# Patient Record
Sex: FEMALE | Race: WHITE
[De-identification: names, ages, dates, MRNs, and addresses within clinical notes are randomized per-mention and may not be internally consistent; named-entity substitution may affect disease eponyms.]

---

## 2019-09-15 ENCOUNTER — HOSPITAL ENCOUNTER (EMERGENCY)
Dept: HOSPITAL 56 - MW.ED | Age: 29
Discharge: HOME | End: 2019-09-15
Payer: COMMERCIAL

## 2019-09-15 DIAGNOSIS — N93.9: Primary | ICD-10-CM

## 2019-09-15 LAB
BUN SERPL-MCNC: 8 MG/DL (ref 7–18)
CHLORIDE SERPL-SCNC: 105 MMOL/L (ref 98–107)
CO2 SERPL-SCNC: 25.8 MMOL/L (ref 21–32)
GLUCOSE SERPL-MCNC: 96 MG/DL (ref 74–106)
POTASSIUM SERPL-SCNC: 3.9 MMOL/L (ref 3.5–5.1)
SODIUM SERPL-SCNC: 141 MMOL/L (ref 136–145)

## 2019-09-15 PROCEDURE — 86901 BLOOD TYPING SEROLOGIC RH(D): CPT

## 2019-09-15 PROCEDURE — 96361 HYDRATE IV INFUSION ADD-ON: CPT

## 2019-09-15 PROCEDURE — 86850 RBC ANTIBODY SCREEN: CPT

## 2019-09-15 PROCEDURE — 85610 PROTHROMBIN TIME: CPT

## 2019-09-15 PROCEDURE — 84702 CHORIONIC GONADOTROPIN TEST: CPT

## 2019-09-15 PROCEDURE — 80053 COMPREHEN METABOLIC PANEL: CPT

## 2019-09-15 PROCEDURE — 96372 THER/PROPH/DIAG INJ SC/IM: CPT

## 2019-09-15 PROCEDURE — 99284 EMERGENCY DEPT VISIT MOD MDM: CPT

## 2019-09-15 PROCEDURE — 96375 TX/PRO/DX INJ NEW DRUG ADDON: CPT

## 2019-09-15 PROCEDURE — 96374 THER/PROPH/DIAG INJ IV PUSH: CPT

## 2019-09-15 PROCEDURE — 76817 TRANSVAGINAL US OBSTETRIC: CPT

## 2019-09-15 PROCEDURE — 85025 COMPLETE CBC W/AUTO DIFF WBC: CPT

## 2019-09-15 PROCEDURE — 86900 BLOOD TYPING SEROLOGIC ABO: CPT

## 2019-09-15 NOTE — EDM.PDOC
ED HPI GENERAL MEDICAL PROBLEM





- General


Chief Complaint: OB/GYN Problem


Stated Complaint: PAIN IN OVARIES


Time Seen by Provider: 09/15/19 11:19


Source of Information: Reports: Patient


History Limitations: Reports: No Limitations





- History of Present Illness


INITIAL COMMENTS - FREE TEXT/NARRATIVE: 





HISTORY AND PHYSICAL:





History of present illness:


Patient is a 29-year-old female presents to the ED with complaint of pelvic 

pain. Patient states she was 6 weeks gestation and had an elective medical 

 4 days ago in [place], [place]. She had taken cytotec and states she had 

pain for 2 days but resolved yesterday. Today she was sleeping and woke up with 

severe lower abdominal pain. She has been soaking a super pad every hour since 

the bleeding started, she states the bleeding cleared yesterday but returned 

today. She states she is passing small clots, no larger than a quarter. 





Review of systems: 


As per history of present illness and below otherwise all systems reviewed and 

negative.





Past medical history: 


As per history of present illness and as reviewed below otherwise 

noncontributory.





Surgical history: 


As per history of present illness and as reviewed below otherwise 

noncontributory.





Social history: 


No reported history of drug or alcohol abuse.





Family history: 


As per history of present illness and as reviewed below otherwise 

noncontributory.





Physical exam:


General: Patient sitting comfortably in no acute distress and nontoxic appearing


HEENT: Atraumatic, normocephalic, pupils reactive, negative for conjunctival 

pallor or scleral icterus, mucous membranes moist, throat clear, neck supple, 

nontender, trachea midline. No meningeal signs. 


Lungs: Clear to auscultation, breath sounds equal bilaterally, chest nontender.


Heart: S1S2, regular, negative for clicks, rubs, or overt murmur.


Abdomen: Soft, nondistended, nontender. Negative for masses or 

hepatosplenomegaly. Negative for costovertebral tenderness. No rigidity, rebound

, guarding.


Pelvis: Stable nontender.


Genitourinary: There is a moderate amount of blood in the vaginal vault with an 

egg sized clot removed from the cervix 


Rectal: Deferred.


Extremities: Atraumatic, negative for cords or calf pain. Neurovascular 

unremarkable.


Neuro: Awake, alert, oriented. Cranial nerves II through XII unremarkable. 

Cerebellum unremarkable. Motor and sensory unremarkable throughout. Exam 

nonfocal.





Notes: Discussed findings with Dr. West. She advised giving the patient IM 

methergine and a prescription for this for 2 days as well as a 1g dose of 

azithromycin. She will follow up with her on Thursday of this week. 





Diagnostics:


CBC, CMP, Type and screen, hcg quant, OB US 





Therapeutics:


1L NS IV 


4mg Zofran IV 


1mg dilaudid IV 





Prescriptions:


Methergine 


Norco (#12) 





Impression: 


pelvic pain and bleeding status post elective  





Plan:


Take medication as instructed. You may take norco as needed for severe pain, do 

not take while driving as it may make you drowsy 


Follow up with Dr. Pritchett on , please call the number provided to 

schedule your appointment


Return to the ED as needed as discussed 





Definitive disposition and diagnosis as appropriate pending reevaluation and 

review of above.





  ** Pelvic


Pain Score (Numeric/FACES): 10





- Related Data


 Allergies











Allergy/AdvReac Type Severity Reaction Status Date / Time


 


No Known Allergies Allergy   Verified 09/15/19 11:15











Home Meds: 


 Home Meds





Dextroamphetamine/Amphetamine [Adderall 10 mg Tablet] 10 mg PO DAILY 09/15/19 [

History]


Ibuprofen 800 mg PO DAILY 09/15/19 [History]


Methylergonovine [Methergine] 0.2 mg PO Q8HR 2 Days #6 tablet 09/15/19 [Rx]











ED ROS GENERAL





- Review of Systems


Review Of Systems: ROS reveals no pertinent complaints other than HPI.





ED EXAM, GI/ABD





- Physical Exam


Exam: See Below (see dictation)





Course





- Vital Signs


Last Recorded V/S: 


 Last Vital Signs











Temp  98.9 F   09/15/19 11:18


 


Pulse  75   09/15/19 12:01


 


Resp  22 H  09/15/19 12:01


 


BP  111/53 L  09/15/19 12:01


 


Pulse Ox  100   09/15/19 12:01














- Orders/Labs/Meds


Orders: 


 Active Orders 24 hr











 Category Date Time Status


 


 Sodium Chloride 0.9% [Saline Flush] Med  09/15/19 11:13 Active





 10 ml FLUSH ASDIRECTED PRN   


 


 Sodium Chloride 0.9% [Saline Flush] Med  09/15/19 11:13 Active





 2.5 ml FLUSH ASDIRECTED PRN   


 


 Saline Lock Insert [OM.PC] Stat Oth  09/15/19 11:13 Ordered








 Medication Orders





Sodium Chloride (Saline Flush)  10 ml FLUSH ASDIRECTED PRN


   PRN Reason: Keep Vein Open


   Last Admin: 09/15/19 11:37  Dose: 10 ml


Sodium Chloride (Saline Flush)  2.5 ml FLUSH ASDIRECTED PRN


   PRN Reason: Keep Vein Open


   Last Admin: 09/15/19 11:38  Dose: 2.5 ml








Labs: 


 Laboratory Tests











  09/15/19 09/15/19 09/15/19 Range/Units





  11:20 11:20 11:20 


 


WBC  10.79    (4.0-11.0)  K/uL


 


RBC  4.16 L    (4.30-5.90)  M/uL


 


Hgb  12.0    (12.0-16.0)  g/dL


 


Hct  35.6 L    (36.0-46.0)  %


 


MCV  85.6    (80.0-98.0)  fL


 


MCH  28.8    (27.0-32.0)  pg


 


MCHC  33.7    (31.0-37.0)  g/dL


 


RDW Std Deviation  45.1    (28.0-62.0)  fl


 


RDW Coeff of Rodney  15    (11.0-15.0)  %


 


Plt Count  219    (150-400)  K/uL


 


MPV  10.00    (7.40-12.00)  fL


 


Neut % (Auto)  76.5    (48.0-80.0)  %


 


Lymph % (Auto)  13.4 L    (16.0-40.0)  %


 


Mono % (Auto)  8.5    (0.0-15.0)  %


 


Eos % (Auto)  1.4    (0.0-7.0)  %


 


Baso % (Auto)  0.2    (0.0-1.5)  %


 


Neut # (Auto)  8.3 H    (1.4-5.7)  K/uL


 


Lymph # (Auto)  1.5    (0.6-2.4)  K/uL


 


Mono # (Auto)  0.9 H    (0.0-0.8)  K/uL


 


Eos # (Auto)  0.2    (0.0-0.7)  K/uL


 


Baso # (Auto)  0.0    (0.0-0.1)  K/uL


 


Nucleated RBC %  0.0    /100WBC


 


Nucleated RBCs #  0    K/uL


 


INR    1.03  


 


Sodium   141   (136-145)  mmol/L


 


Potassium   3.9   (3.5-5.1)  mmol/L


 


Chloride   105   ()  mmol/L


 


Carbon Dioxide   25.8   (21.0-32.0)  mmol/L


 


BUN   8   (7.0-18.0)  mg/dL


 


Creatinine   0.8   (0.6-1.0)  mg/dL


 


Est Cr Clr Drug Dosing   93.37   mL/min


 


Estimated GFR (MDRD)   > 60.0   ml/min


 


Glucose   96   ()  mg/dL


 


Calcium   9.4   (8.5-10.1)  mg/dL


 


Total Bilirubin   0.2   (0.2-1.0)  mg/dL


 


AST   15   (15-37)  IU/L


 


ALT   23   (14-63)  IU/L


 


Alkaline Phosphatase   49   ()  U/L


 


Total Protein   6.9   (6.4-8.2)  g/dL


 


Albumin   3.6   (3.4-5.0)  g/dL


 


Globulin   3.3   (2.6-4.0)  g/dL


 


Albumin/Globulin Ratio   1.1   (0.9-1.6)  


 


HCG, Quant   93550.0   mIU/mL


 


Blood Type     


 


Antibody Screen     














  09/15/19 Range/Units





  11:34 


 


WBC   (4.0-11.0)  K/uL


 


RBC   (4.30-5.90)  M/uL


 


Hgb   (12.0-16.0)  g/dL


 


Hct   (36.0-46.0)  %


 


MCV   (80.0-98.0)  fL


 


MCH   (27.0-32.0)  pg


 


MCHC   (31.0-37.0)  g/dL


 


RDW Std Deviation   (28.0-62.0)  fl


 


RDW Coeff of Rodney   (11.0-15.0)  %


 


Plt Count   (150-400)  K/uL


 


MPV   (7.40-12.00)  fL


 


Neut % (Auto)   (48.0-80.0)  %


 


Lymph % (Auto)   (16.0-40.0)  %


 


Mono % (Auto)   (0.0-15.0)  %


 


Eos % (Auto)   (0.0-7.0)  %


 


Baso % (Auto)   (0.0-1.5)  %


 


Neut # (Auto)   (1.4-5.7)  K/uL


 


Lymph # (Auto)   (0.6-2.4)  K/uL


 


Mono # (Auto)   (0.0-0.8)  K/uL


 


Eos # (Auto)   (0.0-0.7)  K/uL


 


Baso # (Auto)   (0.0-0.1)  K/uL


 


Nucleated RBC %   /100WBC


 


Nucleated RBCs #   K/uL


 


INR   


 


Sodium   (136-145)  mmol/L


 


Potassium   (3.5-5.1)  mmol/L


 


Chloride   ()  mmol/L


 


Carbon Dioxide   (21.0-32.0)  mmol/L


 


BUN   (7.0-18.0)  mg/dL


 


Creatinine   (0.6-1.0)  mg/dL


 


Est Cr Clr Drug Dosing   mL/min


 


Estimated GFR (MDRD)   ml/min


 


Glucose   ()  mg/dL


 


Calcium   (8.5-10.1)  mg/dL


 


Total Bilirubin   (0.2-1.0)  mg/dL


 


AST   (15-37)  IU/L


 


ALT   (14-63)  IU/L


 


Alkaline Phosphatase   ()  U/L


 


Total Protein   (6.4-8.2)  g/dL


 


Albumin   (3.4-5.0)  g/dL


 


Globulin   (2.6-4.0)  g/dL


 


Albumin/Globulin Ratio   (0.9-1.6)  


 


HCG, Quant   mIU/mL


 


Blood Type  A POSITIVE  


 


Antibody Screen  NEGATIVE  











Meds: 


Medications











Generic Name Dose Route Start Last Admin





  Trade Name Freq  PRN Reason Stop Dose Admin


 


Sodium Chloride  10 ml  09/15/19 11:13  09/15/19 11:37





  Saline Flush  FLUSH   10 ml





  ASDIRECTED PRN   Administration





  Keep Vein Open   





     





     





     


 


Sodium Chloride  2.5 ml  09/15/19 11:13  09/15/19 11:38





  Saline Flush  FLUSH   2.5 ml





  ASDIRECTED PRN   Administration





  Keep Vein Open   





     





     





     














Discontinued Medications














Generic Name Dose Route Start Last Admin





  Trade Name Freq  PRN Reason Stop Dose Admin


 


Azithromycin  1,000 mg  09/15/19 13:02  09/15/19 13:28





  Zithromax  PO  09/15/19 13:03  1,000 mg





  NOW STA   Administration





     





     





     





     


 


Hydromorphone HCl  1 mg  09/15/19 11:52  09/15/19 12:00





  Dilaudid  IVPUSH  09/15/19 11:53  1 mg





  ONETIME ONE   Administration





     





     





     





     


 


Sodium Chloride  1,000 mls @ 999 mls/hr  09/15/19 11:13  09/15/19 11:33





  Normal Saline  IV  09/15/19 12:13  999 mls/hr





  STAT ONE   Administration





     





     





     





     


 


Methylergonovine Maleate  0.2 mg  09/15/19 13:01  09/15/19 13:29





  Methergine  IM  09/15/19 13:02  Not Given





  NOW STA   





     





     





     





     


 


Methylergonovine Maleate  0.2 mg  09/15/19 13:25  09/15/19 13:29





  Methergine  IM  09/15/19 13:26  0.2 mg





  NOW ONE   Administration





     





     





     





     


 


Morphine Sulfate  4 mg  09/15/19 11:13  09/15/19 11:34





  Morphine  IVPUSH  09/15/19 11:14  4 mg





  ONETIME ONE   Administration





     





     





     





     














Departure





- Departure


Time of Disposition: 13:27


Disposition: Home, Self-Care 01


Condition: Good


Clinical Impression: 


 Pelvic pain, Status post elective 








- Discharge Information


Prescriptions: 


Methylergonovine [Methergine] 0.2 mg PO Q8HR 2 Days #6 tablet


Referrals: 


PCP,Unknown [Primary Care Provider] - 


Forms:  ED Department Discharge


Additional Instructions: 


The following information is given to patients seen in the emergency department 

who are being discharged to home. This information is to outline your options 

for follow-up care. We provide all patients seen in our emergency department 

with a follow-up referral.





The need for follow-up, as well as the timing and circumstances, are variable 

depending upon the specifics of your emergency department visit.





If you don't have a primary care physician on staff, we will provide you with a 

referral. We always advise you to contact your personal physician following an 

emergency department visit to inform them of the circumstance of the visit and 

for follow-up with them and/or the need for any referrals to a consulting 

specialist.





The emergency department will also refer you to a specialist when appropriate. 

This referral assures that you have the opportunity for follow-up care with a 

specialist. All of these measure are taken in an effort to provide you with 

optimal care, which includes your follow-up.





Under all circumstances we always encourage you to contact your private 

physician who remains a resource for coordinating your care. When calling for 

follow-up care, please make the office aware that this follow-up is from your 

recent emergency room visit. If for any reason you are refused follow-up, 

please contact the [facility] Emergency 

Department at [phone] and asked to speak to the emergency department 

charge nurse.





[facility]


[address] [place] [address]


Phone: [phone]


Fax: [phone]








Take medication as instructed. You may take norco as needed for severe pain, do 

not take while driving as it may make you drowsy 


Follow up with Dr. Pritchett on , please call the number provided to 

schedule your appointment


Return to the ED as needed as discussed 











- My Orders


Last 24 Hours: 


My Active Orders





09/15/19 11:13


Sodium Chloride 0.9% [Saline Flush]   10 ml FLUSH ASDIRECTED PRN 


Sodium Chloride 0.9% [Saline Flush]   2.5 ml FLUSH ASDIRECTED PRN 


Saline Lock Insert [OM.PC] Stat 














- Assessment/Plan


Last 24 Hours: 


My Active Orders





09/15/19 11:13


Sodium Chloride 0.9% [Saline Flush]   10 ml FLUSH ASDIRECTED PRN 


Sodium Chloride 0.9% [Saline Flush]   2.5 ml FLUSH ASDIRECTED PRN 


Saline Lock Insert [OM.PC] Stat

## 2019-09-15 NOTE — US
INDICATION: pelvic pain s/p elective  4 days agoBeta today 13,462



Indication:



Pelvic pain. Status post elective  4 days ago. Positive beta HCG.



Technique:



Pelvic ultrasound. Transabdominal and endovaginal imaging of the pelvis was 

obtained. Endovaginal imaging of the pelvis was obtained to better evaluate 

the adnexa and endometrial complex. Color/spectral Doppler was performed to 

evaluate for ovarian torsion. 



Comparison:



None. 



Findings:



The endometrial complex appears heterogeneous, with significant eccentric 

vascularity on color Doppler. Findings are suspicious for retained products 

of conception, and gynecologic consultation is suggested. Differential 

diagnosis includes a blood clot, which would not explain the significant 

eccentric vascularity. Right ovary measures 4.6 x 2.2 cm. The left ovary 

measures 2.3 x 3.4 x 3.7 cm. Normal, low resistance arterial blood flow is 

preserved to both ovaries on color/spectral Doppler. There is a small 

amount of fluid in the endometrial canal. The endometrial complex measures 

up to 26 millimeters in thickness. Uterine corpus measures 13.5 cm in long 

axis dimension. 



Impression:



1. Thickened and heterogeneous endometrial complex, with surrounding 

hypervascularity on color Doppler. 



2. Differential diagnosis includes retained products of conception or blood 

clot. Endometritis is an additional although less likely consideration. 



3. Gynecologic consultation is advised. 



4. No adnexal mass. 



5. Report called to Cedric Hurd, Emergency Department, 9/15/19, 1256 pm.



Dictated by Luis Miguel German MD @ 9/15/2019 12:57:47 PM



Dictated by: Luis Miguel German MD @ 09/15/2019 12:57:57



(Electronically Signed)

## 2019-09-18 ENCOUNTER — HOSPITAL ENCOUNTER (OUTPATIENT)
Dept: HOSPITAL 56 - MW.SDS | Age: 29
Discharge: HOME | End: 2019-09-18
Attending: OBSTETRICS & GYNECOLOGY
Payer: COMMERCIAL

## 2019-09-18 DIAGNOSIS — F17.210: ICD-10-CM

## 2019-09-18 DIAGNOSIS — O03.4: Primary | ICD-10-CM

## 2019-09-18 DIAGNOSIS — N85.8: ICD-10-CM

## 2019-09-18 DIAGNOSIS — F90.9: ICD-10-CM

## 2019-09-18 DIAGNOSIS — Z79.1: ICD-10-CM

## 2019-09-18 PROCEDURE — 86850 RBC ANTIBODY SCREEN: CPT

## 2019-09-18 PROCEDURE — 36415 COLL VENOUS BLD VENIPUNCTURE: CPT

## 2019-09-18 PROCEDURE — 86901 BLOOD TYPING SEROLOGIC RH(D): CPT

## 2019-09-18 PROCEDURE — 88305 TISSUE EXAM BY PATHOLOGIST: CPT

## 2019-09-18 PROCEDURE — 59812 TREATMENT OF MISCARRIAGE: CPT

## 2019-09-18 PROCEDURE — 86900 BLOOD TYPING SEROLOGIC ABO: CPT

## 2019-09-18 NOTE — PCM.OPNOTE
- General Post-Op/Procedure Note


Date of Surgery/Procedure: 19


Operative Procedure(s): Suction dilation and curettage


Findings: 





Pre-operative bimanual exam: Uterus 9-10 week size, anteverted.


Intraoperative: Small amount of blood and clot evacuated.


Post-operative bimanual exam: Uterus 6-7 week size.


Pre Op Diagnosis: Incomplete 


Post-Op Diagnosis: Complete 


Anesthesia Technique: General LMA


Primary Surgeon: Eloisa Davis


Anesthesia Provider: Dawood Mayer


Pathology: 





Products of conception


EBL in mLs: 30


Complications: None


Condition: Stable

## 2019-09-18 NOTE — OR
SURGEON:

Eloisa Davis MD

 

DATE OF PROCEDURE:  2019

 

PREOPERATIVE DIAGNOSES:

A 29-year-old with incomplete  at roughly 6 weeks of gestation.

 

POSTOPERATIVE DIAGNOSES:

A 29-year-old with incomplete  at roughly 6 weeks of gestation.

 

PROCEDURE:

Suction dilation and curettage.

 

PRIMARY SURGEON:

Eloisa Davis MD.

 

ANESTHESIA PROVIDER:

Dr. Dawood dash.

 

ANESTHESIA:

LMA.

 

COMPLICATIONS:

None.

 

ESTIMATED BLOOD LOSS:

30 mL.

 

FLUIDS:

1 L LR.

 

URINE OUTPUT:

Bladder drained prior to procedure.

 

SPECIMEN:

Products of conception.

 

FINDINGS:

Preoperative bimanual exam, 9-10 week anteverted uterus.  A small amount of

products of conception.  Postoperative exam, 6 to 7 weeks uterus.

 

PROCEDURE IN DETAIL:

The patient was taken to operating room where LMA was obtained.  She was placed

in the dorsal lithotomy position in Jose Guadalupe type stirrups.  Exam under anesthesia

revealed a 9 to 10 week size anteverted uterus with the cervix dilated.  The

patient was prepared and draped in normal sterile fashion.

 

A speculum was inserted into the vagina.  An Allis clamp was used to grasp the

anterior lip of the cervix.  The uterus was sounded to 9 cm.  The cervix was

already dilated, however, Hegar dilators were used to ensure that the size 7

suction curette would adequately fit.  A 7 mm suction curette was advanced to

the uterine fundus.  Suction was then started.  The products of conception were

evacuated with curette rotating on the outward movement.  Gentle sharp curettage

was then performed with a medium curette.  The suction curette was then

reintroduced to clear the uterus.  The Allis was removed from the cervix and

good hemostasis was noted.

 

The patient tolerated procedure well.  The patient was taken to recovery room in

stable condition.

 

 

[id number] / MODL

DD:  2019 08:39:11

DT:  2019 14:56:19

Job #:  [id number]

## 2019-09-18 NOTE — PCM.PREANE
Preanesthetic Assessment





- Anesthesia/Transfusion/Family Hx


Anesthesia History: Prior Anesthesia Without Reaction (dental sedation only)


Family History of Anesthesia Reaction: No


Transfusion History: No Prior Transfusion(s)





- Review of Systems


General: No Symptoms


Pulmonary: No Symptoms


Cardiovascular: No Symptoms


Gastrointestinal: No Symptoms


Neurological: No Symptoms


Other: Reports: None





- Physical Assessment


Vital Signs: 





 Last Vital Signs











Temp  97.9 F   19 06:40


 


Pulse  67   19 06:40


 


Resp  55 H  19 06:40


 


BP  116/55 L  19 06:40


 


Pulse Ox  100   19 06:40











Height: 5 ft 4 in


Weight: 64.41 kg


ASA Class: 2


Mental Status: Alert & Oriented x3


Airway Class: Mallampati = 2


Dentition: Reports: Normal Dentition


ROM/Head Extension: Full


Lungs: Clear to Auscultation, Normal Respiratory Effort


Cardiovascular: Regular Rate, Regular Rhythm





- Allergies


Allergies/Adverse Reactions: 


 Allergies











Allergy/AdvReac Type Severity Reaction Status Date / Time


 


No Known Allergies Allergy   Verified 09/15/19 11:15














- Blood


Blood Available: No





- Anesthesia Plan


Pre-Op Medication Ordered: None





- Acknowledgements


Anesthesia Type Planned: General Anesthesia


Pt an Appropriate Candidate for the Planned Anesthesia: Yes


Alternatives and Risks of Anesthesia Discussed w Pt/Guardian: Yes


Pt/Guardian Understands and Agrees with Anesthesia Plan: Yes


Additional Comments: 





PMH: incomplete Ab, ADHD


PLAN: ga/lma with toradol





PreAnesthesia Questionnaire


HEENT History: Reports: Other (See Below)


Other HEENT History: wears glasses/contacts


Cardiovascular History: Reports: Other (See Below)


Other Cardiovascular History: murmur as an infant


Respiratory History: Reports: None


Gastrointestinal History: Reports: None


Genitourinary History: Reports: None


OB/GYN History: Reports: Pregnancy, Therapeutic 


Other OB/BYN History: elective AB 19 in [facility]


Musculoskeletal History: Reports: None


Neurological History: Reports: None


Psychiatric History: Reports: ADHD


Endocrine/Metabolic History: Reports: None


Hematologic History: Reports: None


Immunologic History: Reports: None


Oncologic (Cancer) History: Reports: None


Dermatologic History: Reports: None





- Infectious Disease History


Infectious Disease History: Reports: None





- Past Surgical History


Head Surgeries/Procedures: Reports: None


HEENT Surgical History: Reports: Oral Surgery


Cardiovascular Surgical History: Reports: None


Respiratory Surgical History: Reports: None


GI Surgical History: Reports: None


Endocrine Surgical History: Reports: None


Neurological Surgical History: Reports: None


Musculoskeletal Surgical History: Reports: None


Oncologic Surgical History: Reports: None


Dermatological Surgical History: Reports: None





- SUBSTANCE USE


Smoking Status *Q: Current Every Day Smoker


Tobacco Use Within Last Twelve Months: Cigarettes





- HOME MEDS


Home Medications: 


 Home Meds





Dextroamphetamine/Amphetamine [Adderall 10 mg Tablet] 10 mg PO DAILY 09/15/19 [

History]


Ibuprofen 800 mg PO DAILY 09/15/19 [History]


Acetaminophen/HYDROcodone [Norco 325-5 MG] 1 tab PO ASDIRECTED PRN 19 [

History]


Amphetamine/Dextroamphetamine [Adderall XR] 15 mg PO PCLUNCH 19 [History]


Methylergonovine [Methergine] 0.2 mg PO BID 19 [History]











- CURRENT (IN HOUSE) MEDS


Current Meds: 





 Current Medications





Lactated Ringer's (Ringers, Lactated)  1,000 mls @ 100 mls/hr IV ASDIRECTED LAKHWINDER


Sodium Chloride (Saline Flush)  10 ml FLUSH ASDIRECTED PRN


   PRN Reason: Keep Vein Open


Sodium Chloride (Saline Flush)  2.5 ml FLUSH ASDIRECTED PRN


   PRN Reason: Keep Vein Open


Sodium Chloride (Normal Saline)  10 ml IV ASDIRECTED PRN


   PRN Reason: IV Use





Discontinued Medications





Doxycycline Hyclate (Vibramycin)  100 mg PO ONETIME ONE


   Stop: 19 05:01


   Last Admin: 19 07:00 Dose:  100 mg

## 2019-09-18 NOTE — PCM.POSTAN
POST ANESTHESIA ASSESSMENT





- MENTAL STATUS


Mental Status: Alert, Oriented





- VITAL SIGNS


Vital Signs: 


 Last Vital Signs











Temp  36.8 C   09/18/19 08:42


 


Pulse  89   09/18/19 08:57


 


Resp  18   09/18/19 08:57


 


BP  119/70   09/18/19 08:57


 


Pulse Ox  95   09/18/19 08:57














- RESPIRATORY


Respiratory Status: Respiratory Rate WNL





- CARDIOVASCULAR


CV Status: Pulse Rate WNL





- GASTROINTESTINAL


GI Status: No Symptoms





- POST OP HYDRATION


Hydration Status: Adequate & Stable





- OBSERVATIONS


Free Text/Narrative:: 





Patient Stable.